# Patient Record
Sex: FEMALE | Race: WHITE | NOT HISPANIC OR LATINO | URBAN - METROPOLITAN AREA
[De-identification: names, ages, dates, MRNs, and addresses within clinical notes are randomized per-mention and may not be internally consistent; named-entity substitution may affect disease eponyms.]

---

## 2020-11-23 NOTE — PATIENT DISCUSSION
The patient was informed that with [address] [facility] for distance, they will need glasses for all near and intermediate activities after surgery. The patient understands there is a possibility they may need an enhancement after surgery. The patient elects Custom Vision OS, goal of emmetropia.

## 2020-11-23 NOTE — PATIENT DISCUSSION
Patient educated to leave contact lens out for one week prior to repeat SO's. Patient may resume CL wear after repeat SO's but must d/c them one day prior to surgery.

## 2020-11-30 NOTE — PATIENT DISCUSSION
The patient was informed that with [address] [facility] [address] for distance, they will need glasses for all near and intermediate activities after surgery. The patient understands there is a possibility they may need an enhancement after surgery. The patient elects Custom Vision OS, goal of emmetropia.

## 2021-01-04 NOTE — PATIENT DISCUSSION
The patient was informed that with [address] [facility] for distance, they will need glasses for all near and intermediate activities after surgery. The patient understands there is a possibility they may need an enhancement after surgery. The patient elects Custom Vision OS, goal of emmetropia.

## 2021-01-04 NOTE — PATIENT DISCUSSION
The patient was informed that with [address] [facility] for distance, they will need glasses for all near and intermediate activities after surgery. The patient understands there is a possibility they may need an enhancement after surgery. The patient elects Custom Vision OS, goal of emmetropia.

## 2021-01-05 NOTE — PATIENT DISCUSSION
ok to proceed with IOL due to [address] for sx made today with KMS and Goal: Custom Vision kala.  KMS ed will need NVO arm's length and in.

## 2021-01-11 NOTE — PATIENT DISCUSSION
Cataract surgery has been performed in the first eye and activities of daily living are still impaired. The patient would like to proceed with cataract surgery in the second eye as scheduled. The patient elects Custom Vision OD, goal of Emmetropia.

## 2021-02-09 NOTE — PATIENT DISCUSSION
Discussed the risks/benefits of laser capsulotomy. Laser recommended. Patient elects to proceed.  consult RADHA MORENO.

## 2021-02-25 NOTE — PATIENT DISCUSSION
2/25/2021:  pt appreciates HH trial lens OD -0.50 and -1.00 OS.  ed would need LVC enh to full RODOLFO targets for personal BCVA.  pt is hesitant to do further Sx right now.

## 2021-02-25 NOTE — PATIENT DISCUSSION
Patient educated to leave contact lens out for one week prior to repeat SO's. Patient may resume CL wear after repeat SO's but must d/c them one day prior to surgery. General

## 2021-03-22 NOTE — PATIENT DISCUSSION
3/22/21 JOJOSE: Discussed R/B/A of lasik with patient. Advised that patient would loose more of her reading ability after lasik. If patient decides to proceed with lasik, will need to come back for repeat topography without CLs x 1 week.

## 2021-03-22 NOTE — PATIENT DISCUSSION
Patient decides to hold off on lasik at this time and treat dry eye. Patient is okay with putting a prescription in her sunglasses for playing tennis.

## 2021-08-20 NOTE — PATIENT DISCUSSION
1 week PO: Patient is doing well post-operatively. The importance of post-op drop compliance was emphasized. Drop scheduled reviewed with patient. Patient to call if any visual changes or concerns. DISPLAY PLAN FREE TEXT

## 2021-10-21 NOTE — PATIENT DISCUSSION
10/21/2021: Patient is still unhappy with distance vision. Also complaining of halos at night.  Discussed R/B/A of lasik with patient. Patient desires better distance vision OU. Will proceed with i-Lasik Juliette OU. Discussed that halos may or may not resolve with lasik.

## 2021-11-18 ENCOUNTER — NEW PATIENT (OUTPATIENT)
Dept: URBAN - METROPOLITAN AREA CLINIC 4 | Facility: CLINIC | Age: 71
End: 2021-11-18

## 2021-11-18 DIAGNOSIS — H11.153: ICD-10-CM

## 2021-11-18 DIAGNOSIS — H35.3131: ICD-10-CM

## 2021-11-18 PROCEDURE — 92004 COMPRE OPH EXAM NEW PT 1/>: CPT

## 2021-11-18 PROCEDURE — 92134 CPTRZ OPH DX IMG PST SGM RTA: CPT

## 2021-11-18 PROCEDURE — 92015 DETERMINE REFRACTIVE STATE: CPT

## 2021-11-18 ASSESSMENT — TONOMETRY
OS_IOP_MMHG: 16
OD_IOP_MMHG: 16

## 2021-11-18 ASSESSMENT — VISUAL ACUITY
OD_GLARE: 20/60
OS_CC: 20/25
OD_CC: 20/50+1
OS_GLARE: 20/50
OU_CC: 20/25+1

## 2021-11-18 ASSESSMENT — KERATOMETRY
OS_AXISANGLE2_DEGREES: 75
OS_AXISANGLE_DEGREES: 165
OD_K1POWER_DIOPTERS: 42.25
OS_K1POWER_DIOPTERS: 43.00
OD_AXISANGLE_DEGREES: 078
OD_AXISANGLE2_DEGREES: 168
OS_K2POWER_DIOPTERS: 43.50
OD_K2POWER_DIOPTERS: 43.00

## 2022-07-05 RX ORDER — EXENATIDE 250 UG/ML
INJECTION SUBCUTANEOUS
COMMUNITY

## 2022-07-05 RX ORDER — INSULIN LISPRO 100 [IU]/ML
INJECTION, SUSPENSION SUBCUTANEOUS
COMMUNITY

## 2022-07-13 PROBLEM — I10 ESSENTIAL HYPERTENSION: Status: ACTIVE | Noted: 2022-07-13

## 2022-07-13 PROBLEM — N95.9 MENOPAUSAL AND POSTMENOPAUSAL DISORDER: Status: ACTIVE | Noted: 2022-07-13

## 2022-07-13 PROBLEM — E11.9 TYPE 2 DIABETES MELLITUS WITHOUT COMPLICATION (HCC): Status: ACTIVE | Noted: 2022-07-13

## 2022-07-13 PROBLEM — E78.2 MIXED HYPERLIPIDEMIA: Status: ACTIVE | Noted: 2022-07-13

## 2022-07-13 PROBLEM — M54.12 CERVICAL RADICULOPATHY AT C7: Status: ACTIVE | Noted: 2022-07-13

## 2022-10-28 ENCOUNTER — PREP FOR PROCEDURE (OUTPATIENT)
Dept: ADMINISTRATIVE | Age: 72
End: 2022-10-28

## 2022-10-28 PROBLEM — N20.0 KIDNEY STONE: Status: ACTIVE | Noted: 2022-10-28

## 2022-11-21 ENCOUNTER — ESTABLISHED PATIENT (OUTPATIENT)
Dept: URBAN - METROPOLITAN AREA CLINIC 4 | Facility: CLINIC | Age: 72
End: 2022-11-21

## 2022-11-21 DIAGNOSIS — E11.9: ICD-10-CM

## 2022-11-21 DIAGNOSIS — H02.831: ICD-10-CM

## 2022-11-21 DIAGNOSIS — H11.153: ICD-10-CM

## 2022-11-21 DIAGNOSIS — H35.3131: ICD-10-CM

## 2022-11-21 DIAGNOSIS — H25.13: ICD-10-CM

## 2022-11-21 DIAGNOSIS — H02.834: ICD-10-CM

## 2022-11-21 PROCEDURE — 92015 DETERMINE REFRACTIVE STATE: CPT

## 2022-11-21 PROCEDURE — 92134 CPTRZ OPH DX IMG PST SGM RTA: CPT

## 2022-11-21 PROCEDURE — 92014 COMPRE OPH EXAM EST PT 1/>: CPT

## 2022-11-21 ASSESSMENT — TONOMETRY
OS_IOP_MMHG: 15
OD_IOP_MMHG: 15

## 2022-11-21 ASSESSMENT — KERATOMETRY
OS_K2POWER_DIOPTERS: 43.50
OS_AXISANGLE_DEGREES: 165
OD_K2POWER_DIOPTERS: 43.00
OS_AXISANGLE2_DEGREES: 64
OS_AXISANGLE2_DEGREES: 75
OD_AXISANGLE_DEGREES: 078
OS_K1POWER_DIOPTERS: 43.00
OD_AXISANGLE2_DEGREES: 168
OS_AXISANGLE_DEGREES: 154
OD_K1POWER_DIOPTERS: 42.25
OD_AXISANGLE2_DEGREES: 173
OD_AXISANGLE_DEGREES: 83

## 2022-11-21 ASSESSMENT — VISUAL ACUITY
OS_CC: 20/30-2
OU_CC: 20/30-2
OS_GLARE: 20/80
OD_CC: 20/30-2

## 2022-11-28 ENCOUNTER — PRE-OP/H&P (OUTPATIENT)
Dept: URBAN - METROPOLITAN AREA CLINIC 4 | Facility: CLINIC | Age: 72
End: 2022-11-28

## 2022-11-28 VITALS — SYSTOLIC BLOOD PRESSURE: 100 MMHG | HEIGHT: 55 IN | DIASTOLIC BLOOD PRESSURE: 55 MMHG | HEART RATE: 92 BPM

## 2022-11-28 DIAGNOSIS — H25.13: ICD-10-CM

## 2022-11-28 PROCEDURE — 92136 OPHTHALMIC BIOMETRY: CPT

## 2022-11-28 PROCEDURE — 99211PRE PRE OP VISIT

## 2023-01-04 ENCOUNTER — POST-OP (OUTPATIENT)
Dept: URBAN - METROPOLITAN AREA CLINIC 4 | Facility: CLINIC | Age: 73
End: 2023-01-04

## 2023-01-04 DIAGNOSIS — Z96.1: ICD-10-CM

## 2023-01-04 PROCEDURE — 99024 POSTOP FOLLOW-UP VISIT: CPT

## 2023-01-04 ASSESSMENT — VISUAL ACUITY
OU_SC: 20/40
OD_SC: 20/70

## 2023-01-05 ASSESSMENT — TONOMETRY: OD_IOP_MMHG: 23

## 2023-01-09 PROBLEM — Z87.19 HISTORY OF GASTROESOPHAGEAL REFLUX (GERD): Status: ACTIVE | Noted: 2023-01-09

## 2023-01-09 PROBLEM — R35.0 INCREASED FREQUENCY OF URINATION: Status: ACTIVE | Noted: 2023-01-09

## 2023-01-09 PROBLEM — R33.9 RETENTION OF URINE: Status: ACTIVE | Noted: 2023-01-09

## 2023-01-09 PROBLEM — I10 HYPERTENSIVE DISORDER: Status: ACTIVE | Noted: 2023-01-09

## 2023-01-09 PROBLEM — Z97.3 WEARS GLASSES: Status: ACTIVE | Noted: 2023-01-09

## 2023-01-09 PROBLEM — R06.83 SNORING: Status: ACTIVE | Noted: 2023-01-09

## 2023-01-09 PROBLEM — R29.898 LEFT LEG WEAKNESS: Status: ACTIVE | Noted: 2023-01-09

## 2023-01-09 PROBLEM — S72.002A CLOSED FRACTURE OF LEFT HIP (HCC): Status: ACTIVE | Noted: 2019-08-06

## 2023-01-09 PROBLEM — E78.5 HYPERLIPIDEMIA: Status: ACTIVE | Noted: 2019-08-07

## 2023-01-09 PROBLEM — I10 ESSENTIAL HYPERTENSION: Status: ACTIVE | Noted: 2019-08-07

## 2023-01-09 PROBLEM — M25.569 KNEE PAIN: Status: ACTIVE | Noted: 2023-01-09

## 2023-01-09 PROBLEM — S62.102A CLOSED FRACTURE OF LEFT WRIST: Status: ACTIVE | Noted: 2023-01-09

## 2023-01-16 ENCOUNTER — POST-OP (OUTPATIENT)
Dept: URBAN - METROPOLITAN AREA CLINIC 4 | Facility: CLINIC | Age: 73
End: 2023-01-16

## 2023-01-16 DIAGNOSIS — Z96.1: ICD-10-CM

## 2023-01-16 DIAGNOSIS — H25.12: ICD-10-CM

## 2023-01-16 PROCEDURE — 99024 POSTOP FOLLOW-UP VISIT: CPT

## 2023-01-16 PROCEDURE — 92136 OPHTHALMIC BIOMETRY: CPT

## 2023-01-16 ASSESSMENT — KERATOMETRY
OD_K1POWER_DIOPTERS: 42.25
OD_AXISANGLE_DEGREES: 51
OD_AXISANGLE2_DEGREES: 141
OD_K2POWER_DIOPTERS: 42.50

## 2023-01-16 ASSESSMENT — TONOMETRY: OD_IOP_MMHG: 15

## 2023-03-16 ENCOUNTER — PREP FOR PROCEDURE (OUTPATIENT)
Dept: ADMINISTRATIVE | Age: 73
End: 2023-03-16

## 2023-04-03 PROBLEM — N20.0 KIDNEY STONES: Status: ACTIVE | Noted: 2023-04-03

## 2023-05-11 PROBLEM — N18.30 CHRONIC RENAL DISEASE, STAGE III (HCC): Status: ACTIVE | Noted: 2023-05-11

## 2024-01-08 ENCOUNTER — ESTABLISHED PATIENT (OUTPATIENT)
Facility: LOCATION | Age: 74
End: 2024-01-08

## 2024-01-08 DIAGNOSIS — E11.9: ICD-10-CM

## 2024-01-08 DIAGNOSIS — H02.834: ICD-10-CM

## 2024-01-08 DIAGNOSIS — H35.3131: ICD-10-CM

## 2024-01-08 DIAGNOSIS — H02.831: ICD-10-CM

## 2024-01-08 DIAGNOSIS — H11.153: ICD-10-CM

## 2024-01-08 PROCEDURE — 92134 CPTRZ OPH DX IMG PST SGM RTA: CPT

## 2024-01-08 PROCEDURE — 92015 DETERMINE REFRACTIVE STATE: CPT

## 2024-01-08 PROCEDURE — 92014 COMPRE OPH EXAM EST PT 1/>: CPT

## 2024-01-08 ASSESSMENT — KERATOMETRY
OS_K2POWER_DIOPTERS: 43.25
OD_AXISANGLE_DEGREES: 77
OD_AXISANGLE2_DEGREES: 167
OD_K1POWER_DIOPTERS: 42.00
OS_AXISANGLE_DEGREES: 78
OS_AXISANGLE2_DEGREES: 168
OS_K1POWER_DIOPTERS: 41.25
OD_K2POWER_DIOPTERS: 43.00

## 2024-01-08 ASSESSMENT — VISUAL ACUITY
OD_GLARE: 20/50
OS_GLARE: 20/50
OD_SC: 20/30-2
OS_SC: 20/25-1
OU_SC: 20/25

## 2024-01-08 ASSESSMENT — TONOMETRY
OD_IOP_MMHG: 12
OS_IOP_MMHG: 13

## 2025-06-30 ENCOUNTER — COMPREHENSIVE EXAM (OUTPATIENT)
Age: 75
End: 2025-06-30

## 2025-06-30 DIAGNOSIS — E11.9: ICD-10-CM

## 2025-06-30 DIAGNOSIS — H02.831: ICD-10-CM

## 2025-06-30 DIAGNOSIS — H11.153: ICD-10-CM

## 2025-06-30 DIAGNOSIS — H26.493: ICD-10-CM

## 2025-06-30 DIAGNOSIS — H02.834: ICD-10-CM

## 2025-06-30 DIAGNOSIS — H35.3131: ICD-10-CM

## 2025-06-30 PROCEDURE — 92015 DETERMINE REFRACTIVE STATE: CPT

## 2025-06-30 PROCEDURE — 92014 COMPRE OPH EXAM EST PT 1/>: CPT

## 2025-06-30 PROCEDURE — 92134 CPTRZ OPH DX IMG PST SGM RTA: CPT

## 2025-07-09 ENCOUNTER — COMPREHENSIVE EXAM (OUTPATIENT)
Age: 75
End: 2025-07-09

## 2025-07-09 DIAGNOSIS — H43.822: ICD-10-CM

## 2025-07-09 DIAGNOSIS — H43.811: ICD-10-CM

## 2025-07-09 DIAGNOSIS — E11.9: ICD-10-CM

## 2025-07-09 DIAGNOSIS — H35.3132: ICD-10-CM

## 2025-07-09 PROCEDURE — 92014 COMPRE OPH EXAM EST PT 1/>: CPT

## 2025-07-09 PROCEDURE — 92201 OPSCPY EXTND RTA DRAW UNI/BI: CPT

## 2025-07-09 PROCEDURE — 92134 CPTRZ OPH DX IMG PST SGM RTA: CPT
